# Patient Record
Sex: FEMALE | Race: WHITE | Employment: FULL TIME | URBAN - METROPOLITAN AREA
[De-identification: names, ages, dates, MRNs, and addresses within clinical notes are randomized per-mention and may not be internally consistent; named-entity substitution may affect disease eponyms.]

---

## 2020-08-14 ENCOUNTER — HOSPITAL ENCOUNTER (OUTPATIENT)
Dept: MAMMOGRAPHY | Age: 40
Discharge: HOME | End: 2020-08-16
Payer: COMMERCIAL

## 2020-08-14 ENCOUNTER — HOSPITAL ENCOUNTER (OUTPATIENT)
Dept: ULTRASOUND IMAGING | Age: 40
Discharge: HOME | End: 2020-08-16
Payer: COMMERCIAL

## 2020-08-14 VITALS — SYSTOLIC BLOOD PRESSURE: 110 MMHG | DIASTOLIC BLOOD PRESSURE: 75 MMHG | HEART RATE: 75 BPM

## 2020-08-14 PROCEDURE — 88360 TUMOR IMMUNOHISTOCHEM/MANUAL: CPT

## 2020-08-14 PROCEDURE — 88305 TISSUE EXAM BY PATHOLOGIST: CPT

## 2020-08-14 PROCEDURE — 19083 BX BREAST 1ST LESION US IMAG: CPT

## 2020-08-14 PROCEDURE — 88377 M/PHMTRC ALYS ISHQUANT/SEMIQ: CPT

## 2020-08-14 PROCEDURE — 2500000003 HC RX 250 WO HCPCS

## 2020-08-14 PROCEDURE — 77065 DX MAMMO INCL CAD UNI: CPT

## 2020-08-19 LAB — SURGICAL PATHOLOGY REPORT: NORMAL

## 2020-08-20 ENCOUNTER — TELEPHONE (OUTPATIENT)
Dept: ONCOLOGY | Age: 40
End: 2020-08-20

## 2020-08-20 NOTE — TELEPHONE ENCOUNTER
Notified of positive breast biopsy and patient to navigate from pathology list.  Noted that this is a Rick patient that came to [address] for biopsy. I called and spoke with Amina. She relates she has biopsy results and have sent them to Ms. Yuriy Valdez' office. They have her scheduled for an appointment tomorrow she believes to get results. Once Michell has the results Shelva Libman will contact her and help her get set up with consultations. Annemarie Angeles know that I am here and available should patient want to pursue any of her treatment in this area.     Will follow up with Shelva Libman to make sure patient is established with oncology team.